# Patient Record
Sex: FEMALE | Race: WHITE | NOT HISPANIC OR LATINO | URBAN - METROPOLITAN AREA
[De-identification: names, ages, dates, MRNs, and addresses within clinical notes are randomized per-mention and may not be internally consistent; named-entity substitution may affect disease eponyms.]

---

## 2017-02-21 ENCOUNTER — AMBULATORY - HEALTHEAST (OUTPATIENT)
Dept: ADMINISTRATIVE | Facility: CLINIC | Age: 82
End: 2017-02-21

## 2017-02-27 ENCOUNTER — OFFICE VISIT - HEALTHEAST (OUTPATIENT)
Dept: GERIATRICS | Facility: CLINIC | Age: 82
End: 2017-02-27

## 2017-02-27 DIAGNOSIS — E03.9 HYPOTHYROIDISM: ICD-10-CM

## 2017-02-27 DIAGNOSIS — I10 ESSENTIAL HYPERTENSION: ICD-10-CM

## 2017-02-27 DIAGNOSIS — Z86.73 HISTORY OF STROKE: ICD-10-CM

## 2017-02-27 DIAGNOSIS — E78.5 HYPERLIPIDEMIA: ICD-10-CM

## 2017-02-27 DIAGNOSIS — S79.912A INJURY OF LEFT HIP: ICD-10-CM

## 2017-02-27 DIAGNOSIS — I48.91 ATRIAL FIBRILLATION, UNSPECIFIED TYPE (H): ICD-10-CM

## 2017-02-28 ENCOUNTER — AMBULATORY - HEALTHEAST (OUTPATIENT)
Dept: GERIATRICS | Facility: CLINIC | Age: 82
End: 2017-02-28

## 2017-03-06 ENCOUNTER — OFFICE VISIT - HEALTHEAST (OUTPATIENT)
Dept: GERIATRICS | Facility: CLINIC | Age: 82
End: 2017-03-06

## 2017-03-06 DIAGNOSIS — I10 ESSENTIAL HYPERTENSION: ICD-10-CM

## 2017-03-06 DIAGNOSIS — I48.91 ATRIAL FIBRILLATION (H): ICD-10-CM

## 2017-03-06 DIAGNOSIS — S79.912A INJURY OF LEFT HIP: ICD-10-CM

## 2017-03-06 DIAGNOSIS — S39.93XA PELVIC INJURY: ICD-10-CM

## 2017-03-06 DIAGNOSIS — Z86.73 HISTORY OF STROKE: ICD-10-CM

## 2017-03-07 ENCOUNTER — AMBULATORY - HEALTHEAST (OUTPATIENT)
Dept: GERIATRICS | Facility: CLINIC | Age: 82
End: 2017-03-07

## 2017-03-09 ENCOUNTER — AMBULATORY - HEALTHEAST (OUTPATIENT)
Dept: GERIATRICS | Facility: CLINIC | Age: 82
End: 2017-03-09

## 2019-01-01 ENCOUNTER — RECORDS - HEALTHEAST (OUTPATIENT)
Dept: LAB | Facility: CLINIC | Age: 84
End: 2019-01-01

## 2019-01-01 ENCOUNTER — AMBULATORY - HEALTHEAST (OUTPATIENT)
Dept: ADMINISTRATIVE | Facility: CLINIC | Age: 84
End: 2019-01-01

## 2019-01-01 ENCOUNTER — AMBULATORY - HEALTHEAST (OUTPATIENT)
Dept: GERIATRICS | Facility: CLINIC | Age: 84
End: 2019-01-01

## 2019-01-01 LAB
ANION GAP SERPL CALCULATED.3IONS-SCNC: 6 MMOL/L (ref 5–18)
ANION GAP SERPL CALCULATED.3IONS-SCNC: 6 MMOL/L (ref 5–18)
BUN SERPL-MCNC: 14 MG/DL (ref 8–28)
BUN SERPL-MCNC: 18 MG/DL (ref 8–28)
CALCIUM SERPL-MCNC: 9 MG/DL (ref 8.5–10.5)
CALCIUM SERPL-MCNC: 9 MG/DL (ref 8.5–10.5)
CHLORIDE BLD-SCNC: 104 MMOL/L (ref 98–107)
CHLORIDE BLD-SCNC: 104 MMOL/L (ref 98–107)
CO2 SERPL-SCNC: 28 MMOL/L (ref 22–31)
CO2 SERPL-SCNC: 31 MMOL/L (ref 22–31)
CREAT SERPL-MCNC: 0.79 MG/DL (ref 0.6–1.1)
CREAT SERPL-MCNC: 0.99 MG/DL (ref 0.6–1.1)
GFR SERPL CREATININE-BSD FRML MDRD: 52 ML/MIN/1.73M2
GFR SERPL CREATININE-BSD FRML MDRD: >60 ML/MIN/1.73M2
GLUCOSE BLD-MCNC: 71 MG/DL (ref 70–125)
GLUCOSE BLD-MCNC: 88 MG/DL (ref 70–125)
POTASSIUM BLD-SCNC: 3.6 MMOL/L (ref 3.5–5)
POTASSIUM BLD-SCNC: 3.9 MMOL/L (ref 3.5–5)
SODIUM SERPL-SCNC: 138 MMOL/L (ref 136–145)
SODIUM SERPL-SCNC: 141 MMOL/L (ref 136–145)

## 2019-01-01 RX ORDER — BISACODYL 10 MG
10 SUPPOSITORY, RECTAL RECTAL DAILY PRN
Status: SHIPPED | COMMUNITY
Start: 2019-01-01

## 2019-01-01 RX ORDER — LORAZEPAM 2 MG/ML
0.5 CONCENTRATE ORAL EVERY 4 HOURS PRN
Status: SHIPPED | COMMUNITY
Start: 2019-01-01

## 2019-01-01 RX ORDER — MORPHINE SULFATE 100 MG/5ML
5 SOLUTION ORAL EVERY 4 HOURS
Status: SHIPPED | COMMUNITY
Start: 2019-01-01

## 2019-01-01 RX ORDER — ONDANSETRON 4 MG/1
4 TABLET, ORALLY DISINTEGRATING ORAL EVERY 6 HOURS PRN
Status: SHIPPED | COMMUNITY
Start: 2019-01-01

## 2019-01-01 RX ORDER — ACETAMINOPHEN 650 MG/1
650 SUPPOSITORY RECTAL EVERY 4 HOURS PRN
Status: SHIPPED | COMMUNITY
Start: 2019-01-01

## 2021-05-31 ENCOUNTER — RECORDS - HEALTHEAST (OUTPATIENT)
Dept: ADMINISTRATIVE | Facility: CLINIC | Age: 86
End: 2021-05-31

## 2021-06-09 NOTE — PROGRESS NOTES
[facility]    Facility:   [facility] [[id number]]   Code Status: DNR/DNI  PCP: Isha Zazueta MD   Phone: [phone]   Fax: [phone]      CHIEF COMPLAINT/REASON FOR VISIT:  Chief Complaint   Patient presents with     Discharge Summary     [facility] 2/20/17-3/9/17.       HISTORY COURSE:  Lucila is a 89 y.o. female with hx prior CVA, afib on coumadin, HTN, hypothyroidism, admitted to TCU for therapy secondary to left hip pain and trauma. She states on 2/18/17 she was outside and slipped on some ice falling onto left hip. She was able to get to a neighbor and called for help, sought evaluation at  at [facility]. Xrays were negative for hip fracture per the clinical note. She refused to go to the hospital for overnight stay or placement. She went home and her niece stayed with her. Subsequently arrangements were made through home care for her to be admitted to TCU on 2/20/17.    No complications during her TCU stay. Her pain is improving. She has extensive bruising and left hip pain. Is on coumadin. She is WBAT. I'm concerned she may have a pelvic fracture so will get xrays tomorrow as she is planning to discharge home on Thurs 3/9/17. Likely management will not change but it may guide her follow up. Will also check Hgb given extensive bruising and INR since she is on coumadin. She has been doing well in therapy, feels ready to discharge home. Pain manageable.       PHYSICAL EXAM:   General: Patient is alert, pleasant elderly female, no distress.   Vitals: /54, Temp 96.7, Pulse 59, RR 20, O2 sat 97%RA.  HEENT: Head is NCAT. Eyes show no injection or icterus. Nares negative. Oropharynx well hydrated.  Neck: Supple. No tenderness or adenopathy. No JVD.  Lungs: Clear bilaterally. No wheezes.  Cardiovascular: Regular rate and rhythm, normal S1, S2.  Back: No spinal or CVA tenderness.  Abdomen: Soft, no tenderness on exam. Bowel sounds present. No guarding  rebound or rigidity.  Extremities: Edema 1 plus on left, none on right LE.  Musculoskeletal: Pain and swelling over left lateral hip.  Skin: Extensive bruising left hip to groin and across to low back, across anteriorly to low abdomen, down left leg to ankle.  Psych: Mood appears good.      MEDICATION LIST:  Current Outpatient Prescriptions   Medication Sig     acetaminophen (TYLENOL) 325 MG tablet Take 325-650 mg by mouth as needed for pain.      amLODIPine (NORVASC) 2.5 MG tablet Take 2.5 mg by mouth daily.     atorvastatin (LIPITOR) 20 MG tablet Take 20 mg by mouth daily.     B-complex with vitamin C tablet Take 1 tablet by mouth daily.     CALCIUM ORAL daily. 150--133 Oral Tablet     CARBOXYMETHYLCELL/HYPROMELLOSE (GENTEAL GEL OPHT) Apply 1 application to eye bedtime.     donepezil (ARICEPT) 5 MG tablet Take 5 mg by mouth bedtime.     estradiol (ESTRACE) 0.01 % (0.1 mg/gram) vaginal cream Insert 1 g into the vagina daily.     glucosamine sulfate 500 mg cap Take 1,000 mg by mouth daily.     multivitamin (MULTIVITAMIN) per tablet Take 1 tablet by mouth daily.     POLYETHYLENE GLYCOL 3350 (MIRALAX ORAL) Take 1 packet by mouth daily. Mix in 8 ounces of liquid and drink     polyvinyl alcohol (LIQUIFILM TEARS) 1.4 % ophthalmic solution Administer 2 drops to both eyes 4 (four) times a day as needed for dry eyes.     warfarin (COUMADIN) 1 MG tablet Take 2-2.5 mg by mouth daily. 3/7/17 INR 3.35. Hold 3/7 then start 2mg daily. Next INR 3/13  Take as directed   2 mg on SUN, and 2.5 mg on Mon,Tues,Wednesday,Thurs,Fri,Sat  Next INR 3-9-2017       DISCHARGE DIAGNOSIS:  1. Left hip injury.   2. Poss pelvic fracture.  3. HTN.   4. Afib.   5. Hx stroke.   6. Hypothyroidism.   7. Dyslipidemia.   8. Mild cognitive impairment.     Total time greater than 30 minutes discharge coordination.      MEDICAL EQUIPMENT NEEDS:  Walker.       DISCHARGE PLAN/FACE TO FACE:  I certify that this patient is under my care and that I, or a  nurse practitioner or physician's assistant working with me, had a face-to-face encounter that meets the physician face-to-face encounter requirements with this patient.     Date of Face-to-Face Encounter: 3/6/17.    I certify that, based on my findings, the following services are medically necessary home health services: PT, RN.    My clinical findings support the need for the above skilled services because: Significant left hp injury, possible pelvic fracture, gait instability and deconditioning. On coumadin for Afib and hx of stroke, will need monitoring.     This patient is homebound because: Gait instability, does not drive. Weak and deconditioned. Too strenuous to leave the home.    The patient is, or has been, under my care and I have initiated the establishment of the plan of care. This patient will be followed by a physician who will periodically review the plan of care.      Electronically signed by: Verito Elena MD

## 2021-06-09 NOTE — PROGRESS NOTES
[facility]      Facility:    [facility] [[id number]]  Code Status: DNR/DNI       Chief Complaint/Reason for Visit:  Chief Complaint   Patient presents with     H & P     New admit to TCU for left hip injury. (H & P 2/27/17).       HPI:   Lucila is a 89 y.o. female with hx prior CVA, afib on coumadin, HTN, hypothyroidism, admitted to TCU for therapy secondary to left hip pain and trauma. She states on 2/18/17 she was outside and slipped on some ice falling onto left hip. She was able to get to a neighbor and called for help, sought evaluation at  at [facility]. Xrays were negative for hip fracture per the clinical note. She refused to go to the hospital for overnight stay or placement. She went home and her niece stayed with her. Subsequently arrangements were made through home care for her to be admitted to TCU on 2/20/17.    Her pain is improving. She has extensive bruising and left hip pain. Is on coumadin. She is WBAT. I reviewed the report of her xray from 2/18/17 which raises concern for pelvic fracture. Further eval not pursued at the time. Would consider ortho referral. Did discuss with her potential to obtain additional xrays or CT scan for further clarification particularly if pain is not getting better or in fact getting worse. At this point will observe further. Tx of pelvic fracture allows WBAT, included pain management and therapy so will stay the course, follow clinically and re-eval if any worsening. Patient comfortable with plan of care. Otherwise she denies headache, nausea, vomiting. chest pain or shortness of breath. No dizziness. Appetite is good. Bowels moving. No difficulty urinating. No new vision or hearing problems.      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      Billowing mitral valve      Cerebellar  infarction      Circulatory system disorder      Cognitive social or emotional deficit following cerebral infarction      Encounter for colonoscopy due to history of adenomatous colonic polyps      Hypertension      Osteoarthritis      Pain in joint, pelvic region and thigh      Raynaud's syndrome without gangrene      Unspecified cerebral artery occlusion with cerebral infarction      Unspecified hypothyroidism            Surgical History:  Past Surgical History:   Procedure Laterality Date     BUNIONECTOMY       HEMORRHOID SURGERY       TONSILLECTOMY         Family History:   Family History   Problem Relation Age of Onset     Heart disease Mother      Stroke Father      Heart disease Brother        Social History:    Social History     Social History     Marital status: Single     Spouse name: N/A     Number of children: N/A     Years of education: N/A     Social History Main Topics     Smoking status: Unknown If Ever Smoked     Smokeless tobacco: Not on file     Alcohol use Not on file     Drug use: Not on file     Sexual activity: Not on file     Other Topics Concern     Not on file     Social History Narrative        Medications:  Current Outpatient Prescriptions   Medication Sig     acetaminophen (TYLENOL) 325 MG tablet Take 325-650 mg by mouth as needed for pain.      amLODIPine (NORVASC) 2.5 MG tablet Take 2.5 mg by mouth daily.     atorvastatin (LIPITOR) 20 MG tablet Take 20 mg by mouth daily.     B-complex with vitamin C tablet Take 1 tablet by mouth daily.     CALCIUM ORAL daily. 002--133 Oral Tablet     CARBOXYMETHYLCELL/HYPROMELLOSE (GENTEAL GEL OPHT) Apply 1 application to eye bedtime.     donepezil (ARICEPT) 5 MG tablet Take 5 mg by mouth bedtime.     estradiol (ESTRACE) 0.01 % (0.1 mg/gram) vaginal cream Insert 1 g into the vagina daily.     glucosamine sulfate 500 mg cap Take 1,000 mg by mouth daily.     multivitamin (MULTIVITAMIN) per tablet Take 1 tablet by mouth daily.     POLYETHYLENE  GLYCOL 3350 (MIRALAX ORAL) Take 1 packet by mouth daily. Mix in 8 ounces of liquid and drink     polyvinyl alcohol (LIQUIFILM TEARS) 1.4 % ophthalmic solution Administer 2 drops to both eyes 4 (four) times a day as needed for dry eyes.     warfarin (COUMADIN) 1 MG tablet Take 2-2.5 mg by mouth daily. 3/7/17 INR 3.35. Hold 3/7 then start 2mg daily. Next INR 3/13  Take as directed   2 mg on SUN, and 2.5 mg on Mon,Tues,Wednesday,Thurs,Fri,Sat  Next INR 3-9-2017       Review of Systems:  Pertinent items are noted in HPI.       Physical Exam:   General: Patient is alert, pleasant elderly female, no distress.   Vitals: /52, Temp 97.1, Pulse 60, RR 18, O2 sat 98%RA.  HEENT: Head is NCAT. Eyes show no injection or icterus. Nares negative. Oropharynx well hydrated.  Neck: Supple. No tenderness or adenopathy. No JVD.  Lungs: Clear bilaterally. No wheezes.  Cardiovascular: Regular rate and rhythm, normal S1. S2.  Back: No spinal or CVA tenderness.  Abdomen: Soft, no tenderness on exam. Bowel sounds present. No guarding rebound or rigidity.  : Deferred.  Extremities: Edema 1-2 plus on left, trace right LE.  Musculoskeletal: Pain and swelling over left lateral hip.  Skin: Extensive bruising left hip to groin and across to low back, across anteriorly to low abdomen, down left leg to ankle.  Psych: Mood appears good.      Assessment/Plan:  1. Left hip injury. Negative hip fracture. Question pelvic fracture though she is okay with WBAT. Follow clinically. Extensive bruising. Ice, pain control.   2. HTN. Cont home Amlodipine. Monitor BPs.  3. Afib. Anticoagulated with coumadin. Monitor and adjust per INR.  4. Hx stroke. No residual strength deficits.  5. Hypothyroidism. On Levothyroxine.  6. Dyslipidemia. Cont home Atorvastatin.  7. Mild cognitive impairment. She is on Aricept.  8. Code status is DNR/DNI.      Total time greater than 60 minutes, greater than 50% counseling and coordination of care, time spent in interview  and examination of patient, review of records, discussion with nursing staff.      Electronically signed by: Verito Elena MD

## 2021-06-15 PROBLEM — I10 ESSENTIAL HYPERTENSION: Status: ACTIVE | Noted: 2017-03-01

## 2021-06-15 PROBLEM — Z86.73 HISTORY OF STROKE: Status: ACTIVE | Noted: 2017-03-01

## 2021-06-15 PROBLEM — E03.9 HYPOTHYROIDISM: Status: ACTIVE | Noted: 2017-03-01

## 2021-06-15 PROBLEM — S79.912A INJURY OF LEFT HIP: Status: ACTIVE | Noted: 2017-03-01

## 2021-06-15 PROBLEM — I48.91 ATRIAL FIBRILLATION, UNSPECIFIED TYPE (H): Status: ACTIVE | Noted: 2017-03-01

## 2021-06-15 PROBLEM — E78.5 HYPERLIPIDEMIA: Status: ACTIVE | Noted: 2017-03-01
